# Patient Record
Sex: MALE | Race: WHITE
[De-identification: names, ages, dates, MRNs, and addresses within clinical notes are randomized per-mention and may not be internally consistent; named-entity substitution may affect disease eponyms.]

---

## 2020-02-07 ENCOUNTER — HOSPITAL ENCOUNTER (OUTPATIENT)
Dept: HOSPITAL 92 - SDC | Age: 85
End: 2020-02-07
Attending: SURGERY
Payer: MEDICARE

## 2020-02-07 VITALS — BODY MASS INDEX: 23.6 KG/M2

## 2020-02-07 DIAGNOSIS — C25.9: Primary | ICD-10-CM

## 2020-02-07 DIAGNOSIS — K21.9: ICD-10-CM

## 2020-02-07 DIAGNOSIS — I10: ICD-10-CM

## 2020-02-07 DIAGNOSIS — Z95.1: ICD-10-CM

## 2020-02-07 DIAGNOSIS — Z79.02: ICD-10-CM

## 2020-02-07 DIAGNOSIS — I25.2: ICD-10-CM

## 2020-02-07 DIAGNOSIS — M10.9: ICD-10-CM

## 2020-02-07 DIAGNOSIS — Z79.899: ICD-10-CM

## 2020-02-07 DIAGNOSIS — Z86.73: ICD-10-CM

## 2020-02-07 DIAGNOSIS — E78.5: ICD-10-CM

## 2020-02-07 DIAGNOSIS — Z79.82: ICD-10-CM

## 2020-02-07 DIAGNOSIS — I25.10: ICD-10-CM

## 2020-02-07 DIAGNOSIS — Z95.5: ICD-10-CM

## 2020-02-07 PROCEDURE — C1788 PORT, INDWELLING, IMP: HCPCS

## 2020-02-07 PROCEDURE — 02HV33Z INSERTION OF INFUSION DEVICE INTO SUPERIOR VENA CAVA, PERCUTANEOUS APPROACH: ICD-10-PCS | Performed by: SURGERY

## 2020-02-07 PROCEDURE — S0020 INJECTION, BUPIVICAINE HYDRO: HCPCS

## 2020-02-07 PROCEDURE — 71045 X-RAY EXAM CHEST 1 VIEW: CPT

## 2020-02-07 PROCEDURE — 36561 INSERT TUNNELED CV CATH: CPT

## 2020-02-07 NOTE — PDOC.OP
Operative Note





- Operative Note


Operative Note: 





PROCEDURE:  Left internal jugular MediPort placement with ultrasound and 

fluoroscopic guidance





DATE OF PROCEDURE: 2/7/2020





SURGEON: Vicky Caballero M.D.





PREOPERATIVE DIAGNOSIS: Pancreatic cancer





POSTOPERATIVE DIAGNOSIS: Pancreatic cancer





HISTORY: Patient has been diagnosed with unresectable pancreatic cancer. 

Chemotherapy has been recommended and a Mediport has been requested for this. 





OPERATIVE PROCEDURE IN DETAIL:  After informed consent was obtained and 

appropriate preoperative antibiotics administered, the patient was taken to the 

operating room and placed in supine position and monitored anesthesia care was 

administered. The patient was then placed in Trendelenburg position and the 

patent compressible left internal jugular vein accessed easily on the first 

attempt under direct ultrasound guidance with excellent flow of dark venous non-

pulsatile blood.  A wire threaded easily and was confirmed to be in the 

compressible vein by ultrasound and with the tip in the superior vena cava by 

fluoroscopy.  Additional local anesthesia was infused to the skin and 

subcutaneous tissues of the left neck and chest.  A skin incision was made on 

the left chest and a subcutaneous pocket developed inferiorly. A Mediport was 

obtained and confirmed to fit in the subcutaneous pocket.  This was secured 

inferiorly to the pectoralis fascia with a Prolene suture, which was clamped, 

but not tied.  Mediport tubing was then tunneled from the chest to the left IJ 

access site subcutaneously. The dilator and sheath were then placed over the 

wire and the dilator and wire removed leaving the sheath in place.  The clamped 

MediPort tubing was tunneled through the sheath, which was then split and 

removed leaving the MediPort tubing in place.  The tubing unfortunately pulled 

back as the sheath was removed and the end flipped up into the left subclavian 

vein.  The tubing was pulled back slightly and a wire wasadvanced back down 

into the vena cava and the tubing advanced until the tip was confirmed by 

fluoroscopy to be in the superior vena cava just above the atrium.  The wire 

was removed and correct positioning of the catheter again confirmed. The tubing 

was clamped at the skin  level and cut and the tubing secured to the port, 

which was then placed in the subcutaneous pocket.  The previously placed suture 

was secured and two additional sutures were placed to fix the port in place 

within the pocket.  The port was aspirated with the Rivers needle and had 

excellent flow of dark venous non-pulsatile blood and easily flushed without 

resistance.  The subcutaneous tissues were closed with a running Monocryl suture

, following which the skin was closed with a running subcuticular Monocryl 

suture.  Dermabond dressings were placed.  The course of the catheter was 

confirmed by fluoroscopy to be smooth with the tip appropriately located in the 

superior vena cava.  The patient was taken back to recovery in good condition.  

Estimated blood loss was minimal.  There were no complications.  There were no 

specimens.

## 2020-02-07 NOTE — RAD
CHEST 1 VIEW:

 

Date:  02/07/2020

 

HISTORY:  

MediPort placement. 

 

FINDINGS:

Heart size is within normal limits with postop sternotomy change. There is elevation of the right hem
idiaphragm. Left-sided MediPort catheter has been placed. Catheter tip overlies the superior vena cav
a. No signs of pneumothorax. 

 

Postoperative changes of the left shoulder noted. Severe arthritic changes and chronic rotator cuff n
oted on the right. 

 

IMPRESSION: 

Left-sided MediPort catheter placed. Catheter tip overlying the superior vena cava. No signs of pneum
othorax. 

 

 

POS: [facility]

## 2020-05-01 ENCOUNTER — HOSPITAL ENCOUNTER (OUTPATIENT)
Dept: HOSPITAL 92 - BICCT | Age: 85
Discharge: HOME | End: 2020-05-01
Attending: INTERNAL MEDICINE
Payer: MEDICARE

## 2020-05-01 DIAGNOSIS — N32.3: ICD-10-CM

## 2020-05-01 DIAGNOSIS — I71.9: ICD-10-CM

## 2020-05-01 DIAGNOSIS — C78.7: ICD-10-CM

## 2020-05-01 DIAGNOSIS — K76.9: ICD-10-CM

## 2020-05-01 DIAGNOSIS — C25.0: Primary | ICD-10-CM

## 2020-05-01 DIAGNOSIS — M48.56XA: ICD-10-CM

## 2020-05-01 LAB — ESTIMATED GFR-MDRD - POC: (no result)

## 2020-05-01 PROCEDURE — 82565 ASSAY OF CREATININE: CPT

## 2020-05-01 PROCEDURE — 71260 CT THORAX DX C+: CPT

## 2020-05-01 PROCEDURE — 74177 CT ABD & PELVIS W/CONTRAST: CPT

## 2020-05-01 NOTE — CT
CT CHEST AND ABDOMEN AND PELVIS WITH IV CONTRAST:

 

INDICATION: 

Pancreatitis cancer.  Evaluate response to treatment.

 

COMPARISON: 

Comparison is made to CT chest, abdomen, and pelvis dated 1/6/2020.  

 

FINDINGS: 

 

CT CHEST:

Lung fields are well aerated.  No infiltrate or effusion.  The subtle nodularity in the right lower l
obe which was described previously is again seen and is unchanged.  Mediastinum is unremarkable with 
no evidence of adenopathy.  Low-attenuation focus with associated calcification along the anterior wa
ll of the left ventricle has been described and this is stable.  No evidence of axillary adenopathy. 
 Osseous structures unremarkable.

 

IMPRESSION: 

Stable CT chest findings.  No evidence of metastatic disease.

 

 

CT ABDOMEN AND PELVIS:

Prominent pneumobilia is again noted as previously described.

 

There are numerous low-attenuation lesions again seen in the liver.  However, these liver lesions hav
e decreased in size and number when compared to the 1/6/2020 study.  The largest lesion in the right 
lobe was previously measured at 2.7 cm AP dimension in the axial plane.  This lesion is more ill-defi
tash today and measures in the 1.5 cm AP dimension range.  Several other lesions in this area of the r
ight lobe of the liver have decreased in size when compared to the prior study.

 

Spleen unremarkable.  The prominent pancreatic duct is unchanged in appearance.  Biliary stent is unc
hanged in appearance.  The density in the region of the head of the pancreas is stable.  Stomach and 
duodenum unremarkable.

 

Adrenal gland is normal.  Kidneys unremarkable.  Small right renal cystic lesion is unchanged.  

 

The urinary bladder is mildly distended.  A diverticulum arising from the posterior bladder is again 
seen and has been noted previously.  This measures approximately 5-6 cm AP dimension and appears unch
anged. 

 

Small bowel loops unremarkable.  Scattered colonic diverticula again noted.

 

Aorta is calcified and ectatic.  Aortic diameter measured at 2.8 cm.  

 

No evidence of adenopathy.

 

Review of the osseous structures shows a new compression fracture at the L1 vertebra.  There is mild 
anterior wedging with superior end plate compression.  There is loss of anterior height seen estimate
d at 20%.  Slight retropulsion of the posterior superior corner of L1.  This mildly flattens the thec
al sac but does not appear to result in significant central canal stenosis.

 

The other vertebrae maintain height.  Degenerative changes are again noted.  There is a grade I anter
olisthesis at L5-S1 with loss of disk space at L5-S1 which appears stable.  

 

IMPRESSION: 

1.  Improvement in the metastatic disease to the liver when compared to the prior exam.  The liver le
sions are smaller and have decreased in number.

 

2.  There is a new compression deformity involving the L1 vertebra today as described above.

 

3.  Bladder diverticulum.

 

4.  Aortic ectasia with mild aneurysmal dilatation.

 

POS: [facility]

## 2020-07-27 ENCOUNTER — HOSPITAL ENCOUNTER (OUTPATIENT)
Dept: HOSPITAL 92 - BICCT | Age: 85
Discharge: HOME | End: 2020-07-27
Attending: INTERNAL MEDICINE
Payer: MEDICARE

## 2020-07-27 DIAGNOSIS — C78.7: ICD-10-CM

## 2020-07-27 DIAGNOSIS — C25.0: Primary | ICD-10-CM

## 2020-07-27 DIAGNOSIS — K86.89: ICD-10-CM

## 2020-07-27 PROCEDURE — 36415 COLL VENOUS BLD VENIPUNCTURE: CPT

## 2020-07-27 PROCEDURE — 71260 CT THORAX DX C+: CPT

## 2020-07-27 PROCEDURE — 86301 IMMUNOASSAY TUMOR CA 19-9: CPT

## 2020-07-27 PROCEDURE — 85025 COMPLETE CBC W/AUTO DIFF WBC: CPT

## 2020-07-27 PROCEDURE — 74177 CT ABD & PELVIS W/CONTRAST: CPT

## 2020-07-27 PROCEDURE — 80053 COMPREHEN METABOLIC PANEL: CPT

## 2020-07-27 NOTE — CT
CT CHEST AND ABDOMEN AND PELVIS WITH IV CONTRAST:

 

INDICATION: 

Neoplasm of pancreas.  Followup.

 

COMPARISON: 

Comparison is made to CT chest, abdomen, and pelvis 5/1/2020.

 

FINDINGS: 

 

CT CHEST:

The lungs remain clear of infiltrate.  Mild chronic parenchymal changes appear stable with interstiti
al thickening.  

 

The nodular opacity seen in the right middle lobe, image 3 axial, remains stable.  Tiny nodularity in
 the posterior right lung base is stable.  Mediastinum unremarkable and stable.  Probable small slidi
ng diaphragmatic hernia.  Osseous structures appear unremarkable.  Probable small sliding diaphragmat
ic hernia.  Osseous structures appear unremarkable.  Mild degenerative changes in the thoracic spine 
appear stable.

 

IMPRESSION: 

Stable chest CT with no evidence of a metastatic lesion.

 

CT ABDOMEN AND PELVIS:

Biliary stent is again noted with intra- and extrahepatic biliary ductal dilatation and pneumobilia. 
 This is a stable finding.

 

Review of the liver again shows metastatic lesions to the liver.  There has been progression of the h
epatic metastatic disease when compared to the 5/1/2020 exam.  The liver lesions have increased in si
ze and number since that study.  The largest lesion is in the peripheral right lobe of the liver jesus
uring up to 4 cm AP dimension today.  On prior exam, there was ill-defined lucency in this region.  

 

Another larger lesion measures 2.2 cm in the posterior upper right lobe of the liver which is a new l
esion.  There is a new lesion in the more inferior right lobe anteriorly measuring 1.6 cm.  An enlarg
ing lesion in the inferior tip of the liver along the lateral surface measures 1.8 cm.  There are oth
er scattered lesions.

 

Spleen is mildly enlarged but stable.  

 

Review of the pancreas again shows mildly prominent pancreatic duct which is unchanged.  There is a m
ultiloculated mass lesion which extends from the pancreatic head toward the midline which has increas
ed in size when compared to prior exam.  This now measures up to 3.1 cm width in the axial plane.  Pr
evious measurement was approximately 2.3 cm in the axial plane.  

 

Adrenal glands appear normal.

 

Kidneys unremarkable.

 

Small bowel loops unremarkable.  Colon unremarkable.  Aorta shows atherosclerotic change and mild ect
carmelita which has been noted previously.  

 

The spine shows degenerative change with degenerative disk changes prominent at L3-4, L4-5, and L5-S1
.  Grade I anterolisthesis of L5-S1 is stable.  The compression deformity at L1 is stable.  

 

Images through the pelvis again show a bladder diverticulum extending from the posterior bladder wall
.  This is a stable finding.

 

IMPRESSION: 

1.  Progression of liver metastatic disease when compared to 5/1/2020.

 

2.  Enlarging multicystic mass from the head of the pancreas when compared to 5/1/2020.

 

POS: [facility]